# Patient Record
Sex: MALE | Race: BLACK OR AFRICAN AMERICAN | URBAN - METROPOLITAN AREA
[De-identification: names, ages, dates, MRNs, and addresses within clinical notes are randomized per-mention and may not be internally consistent; named-entity substitution may affect disease eponyms.]

---

## 2024-06-01 ENCOUNTER — HOSPITAL ENCOUNTER (EMERGENCY)
Age: 49
Discharge: HOME | End: 2024-06-01
Attending: EMERGENCY MEDICINE
Payer: MEDICAID

## 2024-06-01 ENCOUNTER — APPOINTMENT (OUTPATIENT)
Dept: GENERAL RADIOLOGY | Age: 49
End: 2024-06-01
Payer: MEDICAID

## 2024-06-01 VITALS
RESPIRATION RATE: 10 BRPM | OXYGEN SATURATION: 100 % | DIASTOLIC BLOOD PRESSURE: 93 MMHG | BODY MASS INDEX: 29.82 KG/M2 | TEMPERATURE: 98.8 F | HEART RATE: 66 BPM | SYSTOLIC BLOOD PRESSURE: 142 MMHG | WEIGHT: 225 LBS | HEIGHT: 73 IN

## 2024-06-01 DIAGNOSIS — S90.02XA CONTUSION OF LEFT ANKLE, INITIAL ENCOUNTER: ICD-10-CM
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
## 2024-06-01 DIAGNOSIS — T14.8XXA SUPERFICIAL LACERATION: ICD-10-CM

## 2024-06-01 PROCEDURE — 99283 EMERGENCY DEPT VISIT LOW MDM: CPT

## 2024-06-01 PROCEDURE — 73610 X-RAY EXAM OF ANKLE: CPT

## 2024-06-01 RX ORDER — NAPROXEN 500 MG/1
500 TABLET ORAL 2 TIMES DAILY WITH MEALS
Qty: 60 TABLET | Refills: 3 | Status: SHIPPED | OUTPATIENT
Start: 2024-06-01

## 2024-06-01 RX ORDER — CYCLOBENZAPRINE HCL 10 MG
10 TABLET ORAL NIGHTLY PRN
Qty: 10 TABLET | Refills: 0 | Status: SHIPPED | OUTPATIENT
Start: 2024-06-01 | End: 2024-06-11

## 2024-06-01 ASSESSMENT — ENCOUNTER SYMPTOMS
ANAL BLEEDING: 0
CHEST TIGHTNESS: 0
EYE REDNESS: 0
VOMITING: 0
BLOOD IN STOOL: 0
BACK PAIN: 0
COUGH: 0
STRIDOR: 0
ABDOMINAL DISTENTION: 0
CONSTIPATION: 0
PHOTOPHOBIA: 0
DIARRHEA: 0
RHINORRHEA: 0
EYE ITCHING: 0
SORE THROAT: 0
EYE PAIN: 0
VOICE CHANGE: 0
SINUS PRESSURE: 0
FACIAL SWELLING: 0
EYE DISCHARGE: 0
WHEEZING: 0
NAUSEA: 0
SHORTNESS OF BREATH: 0
ABDOMINAL PAIN: 0
TROUBLE SWALLOWING: 0

## 2024-06-01 ASSESSMENT — PAIN DESCRIPTION - PAIN TYPE: TYPE: ACUTE PAIN

## 2024-06-01 ASSESSMENT — PAIN - FUNCTIONAL ASSESSMENT
PAIN_FUNCTIONAL_ASSESSMENT: 0-10
PAIN_FUNCTIONAL_ASSESSMENT: NONE - DENIES PAIN

## 2024-06-01 ASSESSMENT — PAIN DESCRIPTION - FREQUENCY: FREQUENCY: CONTINUOUS

## 2024-06-01 NOTE — DISCHARGE INSTRUCTIONS
Weight bearing as tolerated.   Ice and elevation to help reduce swelling  Clean wound 3x daily and apply antibiotic ointment.   Watch for infection and return if any problems or concerns.
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
## 2024-06-01 NOTE — DISCHARGE INSTR - COC
Continuity of Care Form    Patient Name: Reji Dutton   :  1975  MRN:  [id number]    Admit date:  2024  Discharge date:  ***    Code Status Order: No Order   Advance Directives:     Admitting Physician:  No admitting provider for patient encounter.  PCP: No primary care provider on file.    Discharging Nurse: ***  Discharging Hospital Unit/Room#:   Discharging Unit Phone Number: ***    Emergency Contact:   No emergency contact information on file.    Past Surgical History:  History reviewed. No pertinent surgical history.    Immunization History:     There is no immunization history on file for this patient.    Active Problems:  There is no problem list on file for this patient.      Isolation/Infection:   Isolation            No Isolation          Patient Infection Status       None to display            Nurse Assessment:  Last Vital Signs: BP (!) 142/93   Pulse 66   Temp 98.8 °F (37.1 °C) (Oral)   Resp 10   Ht 1.854 m (6' 1\")   Wt 102.1 kg (225 lb)   SpO2 100%   BMI 29.69 kg/m²     Last documented pain score (0-10 scale):    Last Weight:   Wt Readings from Last 1 Encounters:   24 102.1 kg (225 lb)     Mental Status:  {IP PT MENTAL STATUS:}    IV Access:  { [facility] IV ACCESS:198298133}    Nursing Mobility/ADLs:  Walking   {CHP DME ADLs:747422299}  Transfer  {CHP DME ADLs:153065167}  Bathing  {CHP DME ADLs:714402066}  Dressing  {CHP DME ADLs:623409464}  Toileting  {CHP DME ADLs:980255813}  Feeding  {CHP DME ADLs:861759027}  Med Admin  {CHP DME ADLs:381907933}  Med Delivery   { DOROTA MED Delivery:203086326}    Wound Care Documentation and Therapy:        Elimination:  Continence:   Bowel: {YES / NO:}  Bladder: {YES / NO:}  Urinary Catheter: {Urinary Catheter:013832630}   Colostomy/Ileostomy/Ileal Conduit: {YES / NO:}       Date of Last BM: ***  No intake or output data in the 24 hours ending 24 1836  No intake/output data recorded.    Safety Concerns:     { DOROTA
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
